# Patient Record
Sex: FEMALE | Race: WHITE | NOT HISPANIC OR LATINO | Employment: FULL TIME | URBAN - NONMETROPOLITAN AREA
[De-identification: names, ages, dates, MRNs, and addresses within clinical notes are randomized per-mention and may not be internally consistent; named-entity substitution may affect disease eponyms.]

---

## 2018-02-02 ENCOUNTER — DOCUMENTATION ONLY (OUTPATIENT)
Dept: FAMILY MEDICINE | Facility: OTHER | Age: 33
End: 2018-02-02

## 2018-02-02 PROBLEM — Q78.8 OSTEOPOIKILOSIS: Status: ACTIVE | Noted: 2018-02-02

## 2018-02-02 PROBLEM — F41.1 ANXIETY STATE: Status: ACTIVE | Noted: 2018-02-02

## 2018-02-02 RX ORDER — SUMATRIPTAN 100 MG/1
100 TABLET, FILM COATED ORAL
COMMUNITY
Start: 2016-01-05 | End: 2018-04-09

## 2018-02-02 RX ORDER — DIPHENHYDRAMINE HCL 25 MG
25 CAPSULE ORAL EVERY 4 HOURS PRN
COMMUNITY

## 2018-02-02 RX ORDER — VARENICLINE TARTRATE 1 MG/1
1 TABLET, FILM COATED ORAL 2 TIMES DAILY WITH MEALS
COMMUNITY
Start: 2015-07-27 | End: 2018-03-06

## 2018-02-02 RX ORDER — METHOCARBAMOL 500 MG/1
1 TABLET, FILM COATED ORAL EVERY 8 HOURS PRN
COMMUNITY
End: 2018-03-06

## 2018-03-06 ENCOUNTER — APPOINTMENT (OUTPATIENT)
Dept: GENERAL RADIOLOGY | Facility: OTHER | Age: 33
End: 2018-03-06
Attending: PHYSICIAN ASSISTANT
Payer: COMMERCIAL

## 2018-03-06 ENCOUNTER — HOSPITAL ENCOUNTER (EMERGENCY)
Facility: OTHER | Age: 33
Discharge: HOME | End: 2018-03-06
Attending: PHYSICIAN ASSISTANT | Admitting: PHYSICIAN ASSISTANT
Payer: COMMERCIAL

## 2018-03-06 VITALS
DIASTOLIC BLOOD PRESSURE: 91 MMHG | RESPIRATION RATE: 18 BRPM | TEMPERATURE: 98.3 F | OXYGEN SATURATION: 98 % | SYSTOLIC BLOOD PRESSURE: 140 MMHG

## 2018-03-06 DIAGNOSIS — Q78.8 OSTEOPOIKILOSIS: ICD-10-CM
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
## 2018-03-06 DIAGNOSIS — M25.552 HIP PAIN, LEFT: ICD-10-CM

## 2018-03-06 DIAGNOSIS — S70.02XA CONTUSION OF LEFT HIP AND THIGH, INITIAL ENCOUNTER: ICD-10-CM

## 2018-03-06 DIAGNOSIS — S70.12XA CONTUSION OF LEFT HIP AND THIGH, INITIAL ENCOUNTER: ICD-10-CM

## 2018-03-06 PROCEDURE — 73502 X-RAY EXAM HIP UNI 2-3 VIEWS: CPT

## 2018-03-06 PROCEDURE — 99284 EMERGENCY DEPT VISIT MOD MDM: CPT | Mod: 25 | Performed by: PHYSICIAN ASSISTANT

## 2018-03-06 PROCEDURE — 99283 EMERGENCY DEPT VISIT LOW MDM: CPT | Mod: Z6 | Performed by: PHYSICIAN ASSISTANT

## 2018-03-06 PROCEDURE — 25000128 H RX IP 250 OP 636: Performed by: PHYSICIAN ASSISTANT

## 2018-03-06 PROCEDURE — 25000132 ZZH RX MED GY IP 250 OP 250 PS 637: Performed by: PHYSICIAN ASSISTANT

## 2018-03-06 PROCEDURE — 96372 THER/PROPH/DIAG INJ SC/IM: CPT | Performed by: PHYSICIAN ASSISTANT

## 2018-03-06 RX ORDER — FENTANYL CITRATE 50 UG/ML
100 INJECTION, SOLUTION INTRAMUSCULAR; INTRAVENOUS ONCE
Status: COMPLETED | OUTPATIENT
Start: 2018-03-06 | End: 2018-03-06

## 2018-03-06 RX ORDER — DEXTROAMPHETAMINE SACCHARATE, AMPHETAMINE ASPARTATE MONOHYDRATE, DEXTROAMPHETAMINE SULFATE AND AMPHETAMINE SULFATE 5; 5; 5; 5 MG/1; MG/1; MG/1; MG/1
20 CAPSULE, EXTENDED RELEASE ORAL 2 TIMES DAILY
COMMUNITY

## 2018-03-06 RX ORDER — HYDROCODONE BITARTRATE AND ACETAMINOPHEN 5; 325 MG/1; MG/1
1 TABLET ORAL ONCE
Status: COMPLETED | OUTPATIENT
Start: 2018-03-06 | End: 2018-03-06

## 2018-03-06 RX ORDER — HYDROCODONE BITARTRATE AND ACETAMINOPHEN 5; 325 MG/1; MG/1
1-2 TABLET ORAL EVERY 6 HOURS PRN
Qty: 20 TABLET | Refills: 0 | Status: SHIPPED | OUTPATIENT
Start: 2018-03-06 | End: 2018-04-09

## 2018-03-06 RX ADMIN — FENTANYL CITRATE 100 MCG: 50 INJECTION, SOLUTION INTRAMUSCULAR; INTRAVENOUS at 11:45

## 2018-03-06 RX ADMIN — HYDROCODONE BITARTRATE AND ACETAMINOPHEN 1 TABLET: 5; 325 TABLET ORAL at 12:10

## 2018-03-06 ASSESSMENT — ENCOUNTER SYMPTOMS
ABDOMINAL PAIN: 0
CHILLS: 0
FEVER: 0
SHORTNESS OF BREATH: 0
DIFFICULTY URINATING: 0
HEADACHES: 0
NECK STIFFNESS: 0
CONFUSION: 0
COLOR CHANGE: 0
ARTHRALGIAS: 0
EYE REDNESS: 0

## 2018-03-06 NOTE — DISCHARGE INSTRUCTIONS
Understanding Bone Bruise (Bone Contusion)  A bone bruise is an injury to a bone that is less severe than a bone fracture. Bone bruises are fairly common. They can happen to people of all ages. Any type of bone in your body can be bruised. Other injuries often happen along with a bone bruise, such as damage to nearby ligaments.  What happens when a bone is bruised?  Bone is made of different kinds of tissue. The periosteum is a thin layer of tissue that covers most of a bone. Where bones come together, there is usually a layer of cartilage at the edges. The bone here is called subchondral bone. Deep inside the bone is an area called the medulla. It contains the bone marrow and fibrous tissue called trabeculae.  With a bone fracture, all of the trabeculae in a region of bone have broken. But with a bone bruise, an injury only damages some of these trabeculae. An injury might cause blood to build up in the area beneath the periosteum. This causes a subperiosteal hematoma, a type of bone bruise. An injury might also cause bleeding and swelling in the area between your cartilage and the bone beneath it. This causes a subchondral bone bruise. Or bleeding and swelling can occur in the medulla of your bone. This is called an intraosseous bone bruise.  What causes a bone bruise?  Injury of any kind can cause a bone bruise. Sports injuries, motor vehicle accidents, or falls from a height can cause them. Twisting injuries that cause joint sprains can also cause a bone bruise. Health conditions like arthritis may also lead to a bone bruise. This is because arthritis causes bone surfaces to grind against each other. Child abuse is another cause of bone bruises.  Symptoms of a bone bruise  Symptoms of a bone bruise can include:    Pain and soreness in the injured area    Swelling in the area and soft tissues around it    Change in color of the injured area    Swelling or stiffness of an injured joint  This pain is often more  severe and lasts longer than a soft tissue injury. How severe your symptoms are and how long they last depends on how severe the bone bruise is.  Diagnosing a bone bruise  Your healthcare provider will ask you about your medical history and symptoms. He or she will ask how you got your injury. Your provider will examine the injured area to check for pain, bruising, and swelling. After the exam, your health care provider may be able to tell if you have a bone bruise.  A bone bruise doesn t show up on an X-ray. But you may be given an X-ray to rule out a bone fracture. A fracture may need a different kind of treatment. An MRI can confirm a bone bruise. But your healthcare provider will likely only give you an MRI if your symptoms don t get better.  Date Last Reviewed: 4/1/2017 2000-2017 [employer]. [address] [place] [id number]. All rights reserved. This information is not intended as a substitute for professional medical care. Always follow your healthcare professional's instructions.

## 2018-03-06 NOTE — ED NOTES
"Patient presents via EMS. Patient fell on deck down approx 2 stairs, she landed on her L hip. She reports 10/10 L hip pain, she states the pain radiates down her L leg. VSS, patient appears calm and is able to move when directed. CMS intact in L foot.   Patient reports hx of L hip injury \"a few years ago\"   "

## 2018-03-06 NOTE — LETTER
[facility]  [address]  [place] [address]  Phone: [phone]  Fax: [phone]    March 6, 2018        Kristy Gutierrez  [address]  [address]  [facility] [address]          To whom it may concern:    RE: Kristy Gutierrez    Patient was seen and treated today at our ER and missed work.  She will need the next few days to recover but can return to work on 3/12/18.  She may return sooner if her symptoms improve    Please contact me for questions or concerns.      Sincerely,                  Magdy Abdalla MPA, SOLA

## 2018-03-06 NOTE — ED AVS SNAPSHOT
[facility]    [address]    [place] [address]    Phone:  [phone]    Fax:  [phone]                                       Kristy Gutierrez   MRN: [id number]    Department:  [facility]   Date of Visit:  3/6/2018           Patient Information     Date Of Birth          1985        Your diagnoses for this visit were:     Fall due to ice or snow, initial encounter     Contusion of left hip and thigh, initial encounter     Hip pain, left     Osteopoikilosis        You were seen by Magdy Abdalla PA-C.      Follow-up Information     Follow up with Twan Neil MD. Schedule an appointment as soon as possible for a visit in 1 week.    Specialty:  Family Practice    Why:  As needed, If symptoms worsen    Contact information:    [address]  [place] [address]  [phone]          Discharge Instructions         Understanding Bone Bruise (Bone Contusion)  A bone bruise is an injury to a bone that is less severe than a bone fracture. Bone bruises are fairly common. They can happen to people of all ages. Any type of bone in your body can be bruised. Other injuries often happen along with a bone bruise, such as damage to nearby ligaments.  What happens when a bone is bruised?  Bone is made of different kinds of tissue. The periosteum is a thin layer of tissue that covers most of a bone. Where bones come together, there is usually a layer of cartilage at the edges. The bone here is called subchondral bone. Deep inside the bone is an area called the medulla. It contains the bone marrow and fibrous tissue called trabeculae.  With a bone fracture, all of the trabeculae in a region of bone have broken. But with a bone bruise, an injury only damages some of these trabeculae. An injury might cause blood to build up in the area beneath the periosteum. This causes a subperiosteal hematoma, a type of bone bruise. An injury might also cause  bleeding and swelling in the area between your cartilage and the bone beneath it. This causes a subchondral bone bruise. Or bleeding and swelling can occur in the medulla of your bone. This is called an intraosseous bone bruise.  What causes a bone bruise?  Injury of any kind can cause a bone bruise. Sports injuries, motor vehicle accidents, or falls from a height can cause them. Twisting injuries that cause joint sprains can also cause a bone bruise. Health conditions like arthritis may also lead to a bone bruise. This is because arthritis causes bone surfaces to grind against each other. Child abuse is another cause of bone bruises.  Symptoms of a bone bruise  Symptoms of a bone bruise can include:    Pain and soreness in the injured area    Swelling in the area and soft tissues around it    Change in color of the injured area    Swelling or stiffness of an injured joint  This pain is often more severe and lasts longer than a soft tissue injury. How severe your symptoms are and how long they last depends on how severe the bone bruise is.  Diagnosing a bone bruise  Your healthcare provider will ask you about your medical history and symptoms. He or she will ask how you got your injury. Your provider will examine the injured area to check for pain, bruising, and swelling. After the exam, your health care provider may be able to tell if you have a bone bruise.  A bone bruise doesn t show up on an X-ray. But you may be given an X-ray to rule out a bone fracture. A fracture may need a different kind of treatment. An MRI can confirm a bone bruise. But your healthcare provider will likely only give you an MRI if your symptoms don t get better.  Date Last Reviewed: 4/1/2017 2000-2017 [employer]. [address] [place] [address]. All rights reserved. This information is not intended as a substitute for professional medical care. Always follow your healthcare professional's  instructions.          Discharge References/Attachments     HIP CONTUSION (ENGLISH)      24 Hour Appointment Hotline       To make an appointment at any [facility], call [phone]). If you don't have a family doctor or clinic, we will help you find one. [facility] are conveniently located to serve the needs of you and your family.             Review of your medicines      START taking        Dose / Directions Last dose taken    HYDROcodone-acetaminophen 5-325 MG per tablet   Commonly known as:  NORCO   Dose:  1-2 tablet   Quantity:  20 tablet        Take 1-2 tablets by mouth every 6 hours as needed   Refills:  0          Our records show that you are taking the medicines listed below. If these are incorrect, please call your family doctor or clinic.        Dose / Directions Last dose taken    amphetamine-dextroamphetamine 20 MG per 24 hr capsule   Commonly known as:  ADDERALL XR   Dose:  20 mg        Take 20 mg by mouth 2 times daily   Refills:  0        diphenhydrAMINE 25 MG capsule   Commonly known as:  BENADRYL   Dose:  25 mg        Take 25 mg by mouth every 4 hours as needed Take 25 mg by mouth every 4 hours if needed.   Refills:  0        SUMAtriptan 100 MG tablet   Commonly known as:  IMITREX   Dose:  100 mg        Take 100 mg by mouth every 2 hours as needed for migraine TAKE ONE TABLET BY MOUTH EVERY 2 HOURS AS NEEDED FOR MIGRAINE. MAXIMUM DOSE  MG PER 24 HOURS   Refills:  0                Prescriptions were sent or printed at these locations (1 Prescription)                   Other Prescriptions                Printed at Department/Unit printer (1 of 1)         HYDROcodone-acetaminophen (NORCO) 5-325 MG per tablet                Procedures and tests performed during your visit     XR Hip Left 2-3 Views      Orders Needing Specimen Collection     None      Pending Results     No orders found from 3/4/2018 to 3/7/2018.            Pending Culture Results     No orders  "found from 3/4/2018 to 3/7/2018.            Thank you for choosing [facility]       Thank you for choosing [facility] for your care. Our goal is always to provide you with excellent care. Hearing back from our patients is one way we can continue to improve our services. Please take a few minutes to complete the written survey that you may receive in the mail after you visit with us. Thank you!        [employer] Information     [employer] lets you send messages to your doctor, view your test results, renew your prescriptions, schedule appointments and more. To sign up, go to [email or web address] . Click on \"Log in\" on the left side of the screen, which will take you to the Welcome page. Then click on \"Sign up Now\" on the right side of the page.     You will be asked to enter the access code listed below, as well as some personal information. Please follow the directions to create your username and password.     Your access code is: [email or web address]  Expires: 2018 12:19 PM     Your access code will  in 90 days. If you need help or a new code, please call your [facility] clinic or [phone].        Care EveryWhere ID     This is your Care EveryWhere ID. This could be used by other organizations to access your [facility] medical records  [id number]        Equal Access to Services     LM AUGUSTE : Dary Felix, waaxda luqadaha, qaybta kaalmada braydon, yaa carlton . So [facility] [phone].    ATENCIÓN: Si habla español, tiene a menon disposición servicios gratuitos de asistencia lingüística. Llame al [phone].    We comply with applicable federal civil rights laws and [place] laws. We do not discriminate on the basis of race, color, national origin, age, disability, sex, sexual orientation, or gender identity.            After Visit Summary       This is your record. Keep this with you and show to your community pharmacist(s) and doctor(s) at your next visit.             "

## 2018-03-06 NOTE — ED PROVIDER NOTES
History     Chief Complaint   Patient presents with     Fall     slipped on deck, fell on L hip.      HPI Comments: This is a 31 yo female who was walking down her deck steps when she slipped and fell landing on her left buttocks area.  She felt a pop and she has not been able to WB ever since.  Denies any other injuries.  She was brought to the ER via EMS.   No IV established at this point.     The history is provided by the patient.         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       Latex allergy 06/06/2016     Priority: Medium     Chronic pain disorder 05/23/2016     Priority: Medium     DDD (degenerative disc disease), lumbar 05/23/2016     Priority: Medium     Lumbar facet arthropathy 05/23/2016     Priority: Medium     Myofascial pain syndrome 05/23/2016     Priority: Medium     Pelvic pain 05/23/2016     Priority: Medium     Acetaminophen overdose 04/30/2016     Priority: Medium     Acute respiratory failure (H) 04/30/2016     Priority: Medium     Drug overdose 04/30/2016     Priority: Medium     Seizure (H) 04/30/2016     Priority: Medium     Controlled substance agreement signed 04/05/2016     Priority: Medium     Overview:   Norco 5/325 mg #120/mo and Amphetamine Salt 15 mg #60/mo       Tobacco use 07/27/2015     Priority: Medium     Interstitial cystitis 07/01/2014     Priority: Medium     Right hip pain 08/17/2013     Priority: Medium     ADD (attention deficit disorder) 08/11/2013     Priority: Medium     Hyperlipidemia 07/11/2013     Priority: Medium     Hypertension 07/11/2013     Priority: Medium     Lumbar disc disease 04/11/2013     Priority: Medium     Adjustment disorder with depressed mood 10/13/2011     Priority: Medium     Irritable bowel syndrome 10/13/2011     Priority: Medium     Overview:    Overview:   IMO Update 10/11       Abdominal pain 04/23/2010     Priority: Medium        Past Medical History:    Past Medical History:   Diagnosis Date     Attention-deficit hyperactivity disorder, predominantly inattentive type      Chronic interstitial cystitis without hematuria      Personal history of other medical treatment (CODE)        Past Surgical History:    Past Surgical History:   Procedure Laterality Date     APPENDECTOMY OPEN      11/2008     HYSTERECTOMY TOTAL ABDOMINAL      2010,still has left ovary.     LAPAROSCOPIC CHOLECYSTECTOMY      03/09/07,chronic cholecystectomy/ cholelithiasis.     LAPAROSCOPIC TUBAL LIGATION      2006     SALPINGO-OOPHORECTOMY BILATERAL      07/30/07,R salpingoopherectomy       Family History:    Family History   Problem Relation Age of Onset     Other - See Comments Mother      Kidney stones     Other - See Comments Father      Kidney stones     Family History Negative Brother      Good Health,ADHD     Family History Negative Brother      Good Health,ADHD     Family History Negative Brother      Good Health     Family History Negative Brother      Good Health       Social History:  Marital Status:  Single [1]  Social History   Substance Use Topics     Smoking status: Current Every Day Smoker     Packs/day: 0.75     Years: 21.00     Types: Cigarettes     Smokeless tobacco: Never Used     Alcohol use No        Medications:      amphetamine-dextroamphetamine (ADDERALL XR) 20 MG per 24 hr capsule   diphenhydrAMINE (BENADRYL) 25 MG capsule   SUMAtriptan (IMITREX) 100 MG tablet         Review of Systems   Constitutional: Negative for chills and fever.   HENT: Negative for congestion.    Eyes: Negative for redness.   Respiratory: Negative for shortness of breath.    Cardiovascular: Negative for chest pain.   Gastrointestinal: Negative for abdominal pain.   Genitourinary: Negative for difficulty urinating.   Musculoskeletal: Negative for arthralgias and neck stiffness.         Left hip injury and pain   Skin: Negative for color change.   Neurological: Negative for headaches.   Psychiatric/Behavioral: Negative for confusion.       Physical Exam   BP: (!) 140/91  Heart Rate: 107  Temp: 98.3  F (36.8  C)  Resp: 18  SpO2: 95 %      Physical Exam   Constitutional: She is oriented to person, place, and time. No distress.   HENT:   Head: Atraumatic.   Eyes: EOM are normal. Pupils are equal, round, and reactive to light.   Cardiovascular: Regular rhythm and normal heart sounds.    Pulmonary/Chest: Breath sounds normal. No respiratory distress. She exhibits no tenderness.   Abdominal: Soft. Bowel sounds are normal. There is no tenderness.   Musculoskeletal: She exhibits tenderness.        Left hip: She exhibits decreased range of motion, tenderness and bony tenderness. She exhibits normal strength and no deformity.        Cervical back: She exhibits no tenderness.        Thoracic back: She exhibits no tenderness.        Lumbar back: She exhibits no tenderness.   Left outer hip and buttocks pain on palpation.  She is reluctant to flex and extend the hip due to pain, otherwise CMsx4   Neurological: She is alert and oriented to person, place, and time.   Skin: No abrasion and no laceration noted. She is not diaphoretic.       ED Course     ED Course     Procedures  Results for orders placed or performed during the hospital encounter of 03/06/18   XR Hip Left 2-3 Views    Narrative    PROCEDURE: XR HIP LEFT 2-3 VIEWS 3/6/2018 11:45 AM    HISTORY: trauma and pain;     COMPARISONS: None.    TECHNIQUE: 2 views.    FINDINGS: No acute fracture or dislocation is seen. There is mild  narrowing of the hip joint space without significant spur formation.    There are innumerable tiny sclerotic bone lesions seen in the pelvis,  sacrum and proximal femur.         Impression    IMPRESSION:   1. No acute fracture.  2. Multiple small sclerotic bone lesions. Differential considerations  for this appearance would  include osteopoikilosis. Sclerotic  metastases would also be in the differential but would be unusual in a  patient of this age unless there is a known primary malignancy.    ROBERT CARCAMO MD     Labs Ordered and Resulted from Time of ED Arrival Up to the Time of Departure from the ED - No data to display  Medications   fentaNYL (PF) (SUBLIMAZE) injection 100 mcg (100 mcg Intramuscular Given 3/6/18 1145)   HYDROcodone-acetaminophen (NORCO) 5-325 MG per tablet 1 tablet (1 tablet Oral Given 3/6/18 1210)     Assessments & Plan (with Medical Decision Making)     I have reviewed the nursing notes.    I have reviewed the findings, diagnosis, plan and need for follow up with the patient.      New Prescriptions    HYDROCODONE-ACETAMINOPHEN (NORCO) 5-325 MG PER TABLET    Take 1-2 tablets by mouth every 6 hours as needed       Final diagnoses:   Fall due to ice or snow, initial encounter   Contusion of left hip and thigh, initial encounter   Hip pain, left   Osteopoikilosis     Afebrile.  VSS.  Left hip pain after slip on the ice.  Brought via EMS.  She was given fentanyl Im initially for pain relief.  Xrays show No acute fracture.   Multiple small sclerotic bone lesions. Differential considerations for this appearance would include osteopoikilosis. Sclerotic metastases would also be in the differential but would be unusual in a patient of this age unless there is a known primary malignancy.  Discussed with the patient and she has known osteopoilkilosis.  She was given oral norco for pain as well.  Left hip bony bruise and contusion with pain.  Discussed RICE and rest.  She was fitted for some crutches to assist with ambulation.  Work note written as well.  Rx for short course of norco.  Follow up with orthopedics if symptoms persist for further evaluation as well  3/6/2018   [facility]     Magdy Abdalla PA-C  03/06/18 4538

## 2018-03-06 NOTE — ED AVS SNAPSHOT
[facility]    [address]    [place] [address]    Phone:  [phone]    Fax:  [phone]                                       Kristy Gutierrez   MRN: [id number]    Department:  [facility]   Date of Visit:  3/6/2018           After Visit Summary Signature Page     I have received my discharge instructions, and my questions have been answered. I have discussed any challenges I see with this plan with the nurse or doctor.    ..........................................................................................................................................  Patient/Patient Representative Signature      ..........................................................................................................................................  Patient Representative Print Name and Relationship to Patient    ..................................................               ................................................  Date                                            Time    ..........................................................................................................................................  Reviewed by Signature/Title    ...................................................              ..............................................  Date                                                            Time

## 2018-03-20 ENCOUNTER — HOSPITAL ENCOUNTER (OUTPATIENT)
Dept: MRI IMAGING | Facility: OTHER | Age: 33
Discharge: HOME | End: 2018-03-20
Attending: FAMILY MEDICINE | Admitting: FAMILY MEDICINE
Payer: COMMERCIAL

## 2018-03-20 DIAGNOSIS — M79.18 LEFT BUTTOCK PAIN: ICD-10-CM

## 2018-03-20 DIAGNOSIS — M54.32 LEFT SIDED SCIATICA: ICD-10-CM

## 2018-03-20 DIAGNOSIS — M54.50 LEFT LOW BACK PAIN: ICD-10-CM

## 2018-03-20 DIAGNOSIS — M54.16 RADICULOPATHY OF LUMBAR REGION: ICD-10-CM

## 2018-03-20 PROCEDURE — 72148 MRI LUMBAR SPINE W/O DYE: CPT

## 2018-04-09 ENCOUNTER — OFFICE VISIT (OUTPATIENT)
Dept: FAMILY MEDICINE | Facility: OTHER | Age: 33
End: 2018-04-09
Attending: FAMILY MEDICINE
Payer: COMMERCIAL

## 2018-04-09 VITALS
WEIGHT: 145 LBS | HEART RATE: 90 BPM | SYSTOLIC BLOOD PRESSURE: 138 MMHG | BODY MASS INDEX: 25.08 KG/M2 | DIASTOLIC BLOOD PRESSURE: 84 MMHG

## 2018-04-09 DIAGNOSIS — Q78.8 OSTEOPOIKILOSIS: ICD-10-CM

## 2018-04-09 DIAGNOSIS — T14.8XXA HEMATOMA: Primary | ICD-10-CM

## 2018-04-09 PROCEDURE — 99213 OFFICE O/P EST LOW 20 MIN: CPT | Performed by: FAMILY MEDICINE

## 2018-04-09 RX ORDER — MULTIPLE VITAMINS W/ MINERALS TAB 9MG-400MCG
1 TAB ORAL DAILY
COMMUNITY

## 2018-04-09 RX ORDER — EPINEPHRINE 0.3 MG/.3ML
INJECTION SUBCUTANEOUS
Refills: 1 | COMMUNITY
Start: 2018-02-02

## 2018-04-09 ASSESSMENT — PAIN SCALES - GENERAL: PAINLEVEL: MODERATE PAIN (5)

## 2018-04-09 NOTE — MR AVS SNAPSHOT
"              After Visit Summary   4/9/2018    Kristy Gutierrez    MRN: [id number]           Patient Information     Date Of Birth          1985        Visit Information        Provider Department      4/9/2018 1:45 PM Twan eNil MD [facility]        Today's Diagnoses     Hematoma    -  1    Osteopoikilosis           Follow-ups after your visit        Additional Services     PHYSICAL THERAPY REFERRAL       *This therapy referral will be filtered to a centralized scheduling office at [facility] and the patient will receive a call to schedule an appointment at a [facility] location most convenient for them. *     [facility] provides Physical Therapy evaluation and treatment and many specialty services across the [facility] system.  If requesting a specialty program, please choose from the list below.    If you have not heard from the scheduling office within 2 business days, please call [phone] for all locations, with the exception of [place], please call [phone] and [facility], please call [phone]  Treatment: Evaluation & Treatment  Special Instructions/Modalities: ultrasound  Special Programs: None    Please be aware that coverage of these services is subject to the terms and limitations of your health insurance plan.  Call member services at your health plan with any benefit or coverage questions.      **Note to Provider:  If you are referring outside of [facility] for the therapy appointment, please list the name of the location in the \"special instructions\" above, print the referral and give to the patient to schedule the appointment.                  Who to contact     If you have questions or need follow up information about today's clinic visit or your schedule please contact [facility] directly at [phone].  Normal or non-critical lab and imaging results will be communicated to you by [facility], letter " "or phone within 4 business days after the clinic has received the results. If you do not hear from us within 7 days, please contact the clinic through B[employer] or phone. If you have a critical or abnormal lab result, we will notify you by phone as soon as possible.  Submit refill requests through Bujbu or call your pharmacy and they will forward the refill request to us. Please allow 3 business days for your refill to be completed.          Additional Information About Your Visit        [employer] Information     [employer] lets you send messages to your doctor, view your test results, renew your prescriptions, schedule appointments and more. To sign up, go to [email or web address] . Click on \"Log in\" on the left side of the screen, which will take you to the Welcome page. Then click on \"Sign up Now\" on the right side of the page.     You will be asked to enter the access code listed below, as well as some personal information. Please follow the directions to create your username and password.     Your access code is: [email or web address]  Expires: 2018  1:19 PM     Your access code will  in 90 days. If you need help or a new code, please call your [place] clinic or [phone].        Care EveryWhere ID     This is your Care EveryWhere ID. This could be used by other organizations to access your [place] medical records  [id number]        Your Vitals Were     Pulse BMI (Body Mass Index)                90 25.08 kg/m2           Blood Pressure from Last 3 Encounters:   18 138/84   18 (!) 140/91   16 118/78    Weight from Last 3 Encounters:   18 145 lb (65.8 kg)   16 131 lb 9.6 oz (59.7 kg)   16 132 lb 9.6 oz (60.1 kg)              We Performed the Following     PHYSICAL THERAPY REFERRAL        Primary Care Provider Office Phone # Fax #    Fe Hema [phone]       [facility] [address] [facility] [address]        Equal Access to Services     [facility] " GAAR : Hadii aad ku deneen Felix, waaxda luqadaha, qaybta kaalmada adelalo, waxjohn samy shruthisam la susanarafa carlton . So [facility] [phone].    ATENCIÓN: Si habla español, tiene a menon disposición servicios gratuitos de asistencia lingüística. Llame al [phone].    We comply with applicable federal civil rights laws and [place] laws. We do not discriminate on the basis of race, color, national origin, age, disability, sex, sexual orientation, or gender identity.            Thank you!     Thank you for choosing [facility] AND [facility]  for your care. Our goal is always to provide you with excellent care. Hearing back from our patients is one way we can continue to improve our services. Please take a few minutes to complete the written survey that you may receive in the mail after your visit with us. Thank you!             Your Updated Medication List - Protect others around you: Learn how to safely use, store and throw away your medicines at www.disposemymeds.org.          This list is accurate as of 4/9/18  2:21 PM.  Always use your most recent med list.                   Brand Name Dispense Instructions for use Diagnosis    amphetamine-dextroamphetamine 20 MG per 24 hr capsule    ADDERALL XR     Take 20 mg by mouth 2 times daily        diphenhydrAMINE 25 MG capsule    BENADRYL     Take 25 mg by mouth every 4 hours as needed Take 25 mg by mouth every 4 hours if needed.        EPINEPHrine 0.3 MG/0.3ML injection 2-pack    EPIPEN/ADRENACLICK/or ANY BX GENERIC EQUIV     ADM 0.3 ML IM 1 TIME PRF ANAPHYLAXIS        Multi-vitamin Tabs tablet      Take 1 tablet by mouth daily

## 2018-04-09 NOTE — NURSING NOTE
Patient presents today with complaints of left hip pain after falling down her deck stairs one month ago.  Leilani Vega LPN .............4/9/2018  1:43 PM

## 2018-04-09 NOTE — PROGRESS NOTES
SUBJECTIVE:  Kristy Gutierrez is a 32 year old female here for follow-up.  She fell onto her left buttock in the beginning of March and injured herself.  She is seen in the ER where x-rays did not show any acute abnormalities.  She had significant bruising and swelling.  Her swelling has continued and she continues to have pain especially with sitting.  She has no bleeding abnormalities.  She does not take any anti-inflammatories on a regular basis or antiplatelet medications.    ROS:    As above otherwise ROS is unremarkable.    OBJECTIVE:  /84 (BP Location: Right arm, Patient Position: Sitting, Cuff Size: Adult Regular)  Pulse 90  Wt 145 lb (65.8 kg)  BMI 25.08 kg/m2    EXAM:  General Appearance: Pleasant, alert, appropriate appearance for age. No acute distress  Skin: She has an approximate 10 cm long by 8 cm wide hematoma in her left gluteus.  No bruising is noted.  This is quite tender to palpation.    ASSESSEMENT AND PLAN:    1. Hematoma    2. 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  She states that she has numerous family members with the same disease.  She has no previous malignancy history.  Offered referral to endocrinology which she declined.    In regards to her hematoma would recommend physical therapy for consideration of ultrasound to see if that can be helpful in reducing the size.  We discussed the pros and cons of surgical excision and evacuation she will hold off for now but if symptoms worsen despite therapy we may need to consider this in the future.    Lacho Neil MD    This document was prepared using voice generated software.  While every attempt was made for accuracy, grammatical errors may exist.

## 2022-09-01 ENCOUNTER — TRANSCRIBE ORDERS (OUTPATIENT)
Dept: OTHER | Age: 37
End: 2022-09-01

## 2022-09-01 DIAGNOSIS — E78.5 HYPERLIPIDEMIA: Primary | ICD-10-CM

## 2022-09-01 NOTE — TELEPHONE ENCOUNTER
RECORDS RECEIVED FROM:   DATE RECEIVED:   NOTES STATUS DETAILS   OFFICE NOTE from referring provider    Care Everywhere Dr. Jen ALCANTAR   OFFICE NOTE from other cardiologist    Internal 7-11-22 Dr. Henriquez   DISCHARGE SUMMARY from hospital    N/A    DISCHARGE REPORT from the ER   N/A    OPERATIVE REPORT    N/A    MEDICATION LIST   Internal    LABS     BMP   Care Everywhere 7-14-22   CBC   Care Everywhere 7-14-22   CMP   N/A    Lipids   Care Everywhere 7-30-22   TSH   Care Everywhere 7-14-22   DIAGNOSTIC PROCEDURES     EKG   Internal 4-21-22   Monitor Reports   N/A    IMAGING (DISC & REPORT)      Echo   N/A    Stress Tests   N/A    Cath   N/A    MRI/MRA   N/A    CT/CTA   N/A

## 2022-10-24 ENCOUNTER — PRE VISIT (OUTPATIENT)
Dept: CARDIOLOGY | Facility: CLINIC | Age: 37
End: 2022-10-24

## (undated) RX ORDER — HYDROCODONE BITARTRATE AND ACETAMINOPHEN 5; 325 MG/1; MG/1
TABLET ORAL
Status: DISPENSED
Start: 2018-03-06

## (undated) RX ORDER — FENTANYL CITRATE 50 UG/ML
INJECTION, SOLUTION INTRAMUSCULAR; INTRAVENOUS
Status: DISPENSED
Start: 2018-03-06